# Patient Record
Sex: MALE | Race: WHITE | Employment: FULL TIME | URBAN - NONMETROPOLITAN AREA
[De-identification: names, ages, dates, MRNs, and addresses within clinical notes are randomized per-mention and may not be internally consistent; named-entity substitution may affect disease eponyms.]

---

## 2017-08-10 ENCOUNTER — OFFICE VISIT - GICH (OUTPATIENT)
Dept: FAMILY MEDICINE | Facility: OTHER | Age: 37
End: 2017-08-10

## 2017-08-10 ENCOUNTER — HISTORY (OUTPATIENT)
Dept: FAMILY MEDICINE | Facility: OTHER | Age: 37
End: 2017-08-10

## 2017-08-10 DIAGNOSIS — J02.9 ACUTE PHARYNGITIS: ICD-10-CM

## 2017-08-10 LAB — STREP A ANTIGEN - HISTORICAL: NEGATIVE

## 2017-12-28 NOTE — PROGRESS NOTES
"Patient Information     Patient Name MRN Sex Rubén Neff [id number] Male 1980      Progress Notes by Josef Vergara MD at 8/10/2017  9:45 AM     Author:  Josef Vergara MD Service:  (none) Author Type:  Physician     Filed:  8/10/2017  1:05 PM Encounter Date:  8/10/2017 Status:  Signed     :  Josef Vergara MD (Physician)            SUBJECTIVE:    Rubén Kay is a 37 y.o. male who presents for sore throat    HPI    2 weeks ago started with mild left pharyngeal pain.  This week it moved to the left.  Last night was worse with more scratching.  Worse with swallowing.  No fevers, no cough.  No rhinorrhea.  No known exposures.    No Known Allergies,   Current Outpatient Prescriptions on File Prior to Visit       Medication  Sig Dispense Refill     desonide 0.05% (TRIDESILON 0.05% OINTMENT) 0.05 % ointment Apply  topically to affected area(s) 2 times daily. 60 g 11     No current facility-administered medications on file prior to visit.    , No past medical history on file. and   Past Surgical History:      Procedure  Laterality Date     PARATHYROIDECTOMY         REVIEW OF SYSTEMS:  Review of Systems   Constitutional: Negative for chills and fever.   HENT: Positive for sore throat. Negative for congestion.    Respiratory: Negative for cough.    Gastrointestinal: Negative for heartburn.   Neurological: Negative for headaches.       OBJECTIVE:  /64  Temp 98.7  F (37.1  C) (Temporal)  Resp 16  Ht 1.941 m (6' 4.4\")  Wt 96.8 kg (213 lb 6.4 oz)  BMI 25.7 kg/m2    EXAM:   Physical Exam   Constitutional: He is well-developed, well-nourished, and in no distress. No distress.   HENT:   Head: Normocephalic and atraumatic.   Mild cobblestoning and mild erythema of left anterior tonsillar pillar.   Eyes: Conjunctivae are normal. Pupils are equal, round, and reactive to light.   Pulmonary/Chest: Effort normal. No respiratory distress. He has no wheezes. He has no rales.   Skin: He is not " diaphoretic.   Psychiatric: Memory, affect and judgment normal.       ASSESSMENT/PLAN:    ICD-10-CM    1. Pharyngitis, unspecified etiology J02.9 RAPID STREP WITH REFLEX CULTURE        Plan:      Results for orders placed or performed in visit on 08/10/17      RAPID STREP WITH REFLEX CULTURE      Result  Value Ref Range    STREP A ANTIGEN           Negative Negative     Suspect this is viral.  Symptom support with OTC medications as needed.      Josef Vergara MD ....................  8/10/2017   1:05 PM

## 2017-12-30 NOTE — NURSING NOTE
Patient Information     Patient Name MRN Sex Rubén Neff [id number] Male 1980      Nursing Note by Anabell Munoz at 8/10/2017  9:45 AM     Author:  Anabell Munoz Service:  (none) Author Type:  (none)     Filed:  8/10/2017  9:59 AM Encounter Date:  8/10/2017 Status:  Signed     :  Anabell Munoz            Started on the Rt side and for the last two week has had a sore throat, swollen,   Anabell Munoz ....................  8/10/2017   9:52 AM

## 2018-01-26 VITALS
HEIGHT: 76 IN | BODY MASS INDEX: 25.99 KG/M2 | DIASTOLIC BLOOD PRESSURE: 64 MMHG | SYSTOLIC BLOOD PRESSURE: 122 MMHG | WEIGHT: 213.4 LBS | TEMPERATURE: 98.7 F | RESPIRATION RATE: 16 BRPM

## 2018-02-27 ENCOUNTER — DOCUMENTATION ONLY (OUTPATIENT)
Dept: FAMILY MEDICINE | Facility: OTHER | Age: 38
End: 2018-02-27

## 2018-02-27 RX ORDER — DESONIDE 0.5 MG/G
OINTMENT TOPICAL
COMMUNITY
Start: 2016-02-25

## 2020-11-13 ENCOUNTER — ALLIED HEALTH/NURSE VISIT (OUTPATIENT)
Dept: FAMILY MEDICINE | Facility: OTHER | Age: 40
End: 2020-11-13
Attending: FAMILY MEDICINE
Payer: COMMERCIAL

## 2020-11-13 DIAGNOSIS — Z20.822 EXPOSURE TO 2019 NOVEL CORONAVIRUS: Primary | ICD-10-CM

## 2020-11-13 DIAGNOSIS — Z20.822 EXPOSURE TO 2019 NOVEL CORONAVIRUS: ICD-10-CM

## 2020-11-13 PROCEDURE — 99212 OFFICE O/P EST SF 10 MIN: CPT | Mod: TEL | Performed by: FAMILY MEDICINE

## 2020-11-13 PROCEDURE — U0003 INFECTIOUS AGENT DETECTION BY NUCLEIC ACID (DNA OR RNA); SEVERE ACUTE RESPIRATORY SYNDROME CORONAVIRUS 2 (SARS-COV-2) (CORONAVIRUS DISEASE [COVID-19]), AMPLIFIED PROBE TECHNIQUE, MAKING USE OF HIGH THROUGHPUT TECHNOLOGIES AS DESCRIBED BY CMS-2020-01-R: HCPCS | Mod: ZL | Performed by: FAMILY MEDICINE

## 2020-11-13 PROCEDURE — C9803 HOPD COVID-19 SPEC COLLECT: HCPCS

## 2020-11-13 PROCEDURE — 99207 PR NO CHARGE NURSE ONLY: CPT

## 2020-11-13 SDOH — HEALTH STABILITY: MENTAL HEALTH: HOW OFTEN DO YOU HAVE A DRINK CONTAINING ALCOHOL?: NOT ASKED

## 2020-11-13 SDOH — HEALTH STABILITY: MENTAL HEALTH: HOW MANY STANDARD DRINKS CONTAINING ALCOHOL DO YOU HAVE ON A TYPICAL DAY?: NOT ASKED

## 2020-11-13 SDOH — HEALTH STABILITY: MENTAL HEALTH: HOW OFTEN DO YOU HAVE 6 OR MORE DRINKS ON ONE OCCASION?: NOT ASKED

## 2020-11-13 ASSESSMENT — ENCOUNTER SYMPTOMS
MYALGIAS: 0
SORE THROAT: 0
COUGH: 0
SINUS PAIN: 0
SINUS PRESSURE: 0
HEADACHES: 0
DIARRHEA: 0
RHINORRHEA: 0
CHILLS: 0
FEVER: 0
SHORTNESS OF BREATH: 0
ABDOMINAL PAIN: 0

## 2020-11-13 NOTE — NURSING NOTE
"Chief Complaint   Patient presents with     Covid Concern     exposed no symptoms         Initial There were no vitals taken for this visit. Estimated body mass index is 25.7 kg/m  as calculated from the following:    Height as of 8/10/17: 1.941 m (6' 4.4\").    Weight as of 8/10/17: 96.8 kg (213 lb 6.4 oz).    Medication Reconciliation: complete      Norma J. Gosselin, LPN  "

## 2020-11-13 NOTE — PROGRESS NOTES
"Rubén Kay is a 40 year old male who is being evaluated via a billable telephone visit.      The patient has been notified of following:     \"This telephone visit will be conducted via a call between you and your physician/provider. We have found that certain health care needs can be provided without the need for a physical exam.  This service lets us provide the care you need with a short phone conversation.  If a prescription is necessary we can send it directly to your pharmacy.  If lab work is needed we can place an order for that and you can then stop by our lab to have the test done at a later time.    Telephone visits are billed at different rates depending on your insurance coverage. During this emergency period, for some insurers they may be billed the same as an in-person visit.  Please reach out to your insurance provider with any questions.    If during the course of the call the physician/provider feels a telephone visit is not appropriate, you will not be charged for this service.\"    Patient has given verbal consent for Telephone visit?  Yes    What phone number would you like to be contacted at? [phone]    How would you like to obtain your AVS? Joe Harper     Rubén Kay is a 40 year old male who presents via phone visit today for the following health issues:    HPI  COVID Concern:  On Sunday had a friend and his family over to his house for lunch.  His friend was tested for COVID on Monday and was recently told that the test was positive.  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  Lunch lasted about 3 hours and contact occurred within six feet.  No masks were worn.  He has not developed symptoms.  No loss of sense of taste or smell.    Review of Systems   Constitutional: Negative for chills and fever.   HENT: Positive for congestion and postnasal drip. Negative for ear pain, rhinorrhea, sinus pressure, sinus pain and sore throat.  "   Respiratory: Negative for cough and shortness of breath.    Cardiovascular: Negative for chest pain.   Gastrointestinal: Negative for abdominal pain and diarrhea.   Musculoskeletal: Negative for myalgias.   Skin: Negative for rash.   Neurological: Negative for headaches.      Objective   Vitals - Patient Reported  Temperature (Patient Reported): 98.3  F (36.8  C)  alert and no distress  PSYCH: Alert and oriented times 3; coherent speech, normal   rate and volume, able to articulate logical thoughts, able   to abstract reason, no tangential thoughts, no hallucinations   or delusions  His affect is normal  RESP: No cough, no audible wheezing, able to talk in full sentences  Remainder of exam unable to be completed due to telephone visits    Assessment/Plan:    Assessment & Plan     Exposure to 2019 novel coronavirus  Meets testing criteria.  COVID-19 test ordered.  Advised him to presume his test is positive while awaiting results and begin self and household quarantine.  If test is positive, will need to complete full 14 day quarantine.  Counseled on symptom management and emergent symptoms requiring reevaluation.  - Asymptomatic COVID-19 Virus (Coronavirus) by PCR; Future     [facility]    Phone call duration:  6 minutes

## 2020-11-16 LAB
SARS-COV-2 RNA SPEC QL NAA+PROBE: NOT DETECTED
SPECIMEN SOURCE: NORMAL

## 2021-01-03 ENCOUNTER — HEALTH MAINTENANCE LETTER (OUTPATIENT)
Age: 41
End: 2021-01-03

## 2021-10-09 ENCOUNTER — HEALTH MAINTENANCE LETTER (OUTPATIENT)
Age: 41
End: 2021-10-09

## 2022-01-29 ENCOUNTER — HEALTH MAINTENANCE LETTER (OUTPATIENT)
Age: 42
End: 2022-01-29

## 2022-09-17 ENCOUNTER — HEALTH MAINTENANCE LETTER (OUTPATIENT)
Age: 42
End: 2022-09-17

## 2023-05-06 ENCOUNTER — HEALTH MAINTENANCE LETTER (OUTPATIENT)
Age: 43
End: 2023-05-06